# Patient Record
Sex: FEMALE | Race: WHITE | NOT HISPANIC OR LATINO
[De-identification: names, ages, dates, MRNs, and addresses within clinical notes are randomized per-mention and may not be internally consistent; named-entity substitution may affect disease eponyms.]

---

## 2023-04-20 ENCOUNTER — APPOINTMENT (OUTPATIENT)
Dept: ORTHOPEDIC SURGERY | Facility: CLINIC | Age: 73
End: 2023-04-20

## 2023-04-26 ENCOUNTER — APPOINTMENT (OUTPATIENT)
Dept: PAIN MANAGEMENT | Facility: CLINIC | Age: 73
End: 2023-04-26

## 2023-04-27 ENCOUNTER — APPOINTMENT (OUTPATIENT)
Dept: PAIN MANAGEMENT | Facility: CLINIC | Age: 73
End: 2023-04-27
Payer: MEDICARE

## 2023-04-27 VITALS — WEIGHT: 126 LBS | BODY MASS INDEX: 18.66 KG/M2 | HEIGHT: 69 IN

## 2023-04-27 DIAGNOSIS — Z87.39 PERSONAL HISTORY OF OTHER DISEASES OF THE MUSCULOSKELETAL SYSTEM AND CONNECTIVE TISSUE: ICD-10-CM

## 2023-04-27 DIAGNOSIS — M54.50 LOW BACK PAIN, UNSPECIFIED: ICD-10-CM

## 2023-04-27 DIAGNOSIS — Z86.79 PERSONAL HISTORY OF OTHER DISEASES OF THE CIRCULATORY SYSTEM: ICD-10-CM

## 2023-04-27 DIAGNOSIS — G62.9 POLYNEUROPATHY, UNSPECIFIED: ICD-10-CM

## 2023-04-27 PROCEDURE — 99204 OFFICE O/P NEW MOD 45 MIN: CPT

## 2023-04-27 NOTE — PHYSICAL EXAM
[Normal DTR UE/LE] : normal DTR UE/LE  [Normal Coordination] : normal coordination [Normal Sensation] : normal sensation [Normal Mood and Affect] : normal mood and affect [Orientated] : orientated [Able to Communicate] : able to communicate [Well Developed] : well developed [Well Nourished] : well nourished [] : negative sitting straight leg raise

## 2023-04-27 NOTE — HISTORY OF PRESENT ILLNESS
[FreeTextEntry1] : This is a 72-year-old female here to establish care for lower back pain and right leg pain. Her pain has been chronic and ongoing for several years.  She recently moved back to [place] from [facility]. She was under the care of a pain management provider and medically managed by their office  on MS Contin 15 mg once daily, Percocet 10/325 mg t.i.d., trazodone 100 mg and Cymbalta 120 mg. She also states that she trialed a ketamine drip during 1 of her flare ups which she did not like the effects of.  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  She denies any radicular pain into the lower extremities.  There is no recent imaging for review today.  She did bring with her some office notes and past urine tox screens which were consistent.\par \par Of note, the  shows she last filled the MSContin in October 2022. She states this is not arcuate. \par \par The patient has had this pain for 4 years. The pain started after surgery Patient describes pain as moderate to severe and intermittent. During the last month the pain has been worse during the morning. Pain described as burning, sharp, throbbing. Patient has weakness in the lower extremities. Pain is increased with sitting, walking. Bowel or bladder habits have not changed.\par \par ACTIVITIES: Patient can sit 30 minutes before pain starts. Patient can stand 20 minutes before pain starts. The patient sometimes lays down because of pain. Patient uses assistive walking device at this time. \par PRIOR PAIN TREATMENTS: Excellent relief with nerve block injection. Moderate relief with cold treatment.\par \par PRIOR PAIN MEDICATIONS:  Morphine, oxycodone, Cymbalta, Lyrica, gabapentin.

## 2023-04-27 NOTE — ASSESSMENT
[FreeTextEntry1] : This is a 72-year-old female with complaints of  lower back and right leg pain secondary to neuropathy.  She is aware that I am not willing to prescribe the same medication regimen that she was previously on.  I made her aware that I would be willing to prescribe MS Contin 15 mg BID and the Cymbalta but not the Percocet for her chronic pain.  sShe is aware that we would prescribe her something for withdrawal symptoms should she experience them during the transition.  She would like to think this over and contact the office if she would like to move forward.  If she does move forward, I will order an updated MRI of the lumbar spine, EMG of the lower extremities, and a neuropsychology evaluation to determine appropriateness for chronic pain medications.  She would also to undergo a urine tox screen.  All this patients questions were answered and the conversation was understood well.\par \par I, Brittny Stovall, attest that this documentation has been prepared under the direction and in the presence of Provider Rita Schmitz MD.\par \par \par Thank you for allowing me to assist in the management of this patient. \par \par \par Best Regards, \par \par \par Rita Schmitz M.D., FAAPMR\par \par \par Diplomate, American Board of Physical Medicine and Rehabilitation\par Diplomate, American Board of Pain Medicine \par Diplomate, American Board of Pain Management\par

## 2023-04-27 NOTE — DATA REVIEWED
[FreeTextEntry1] : SOAPP: Low risk\par \par UDS: No data obtained today.\par \par [place] REGISTRY: Checked.\par